# Patient Record
Sex: MALE | Race: WHITE
[De-identification: names, ages, dates, MRNs, and addresses within clinical notes are randomized per-mention and may not be internally consistent; named-entity substitution may affect disease eponyms.]

---

## 2020-09-21 ENCOUNTER — HOSPITAL ENCOUNTER (EMERGENCY)
Dept: HOSPITAL 75 - ER FS | Age: 20
Discharge: HOME | End: 2020-09-21
Payer: SELF-PAY

## 2020-09-21 VITALS — WEIGHT: 134.48 LBS | HEIGHT: 66.97 IN | BODY MASS INDEX: 21.11 KG/M2
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
## 2020-09-21 DIAGNOSIS — S60.121A: Primary | ICD-10-CM
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
## 2020-09-21 PROCEDURE — 73130 X-RAY EXAM OF HAND: CPT

## 2020-09-21 NOTE — ED UPPER EXTREMITY
General


Chief Complaint:  Upper Extremity


Stated Complaint:  SMASHED RT HAND





History of Present Illness


Date Seen by Provider:  Sep 21, 2020


Time Seen by Provider:  15:42


Initial Comments


Patient's right middle finger was smashed in a roller at work and he is hurting 

primarily on the distal tip.  No obvious open wounds or abrasions however there 

is some swelling at the tip.





Allergies and Home Medications


Allergies


Coded Allergies:  


     No Known Drug Allergies (Unverified , 9/21/20)





Patient Home Medication List


Home Medication List Reviewed:  Yes





Review of Systems


Constitutional:  no symptoms reported


Musculoskeletal:  joint pain, joint swelling


Skin:  no symptoms reported


Psychiatric/Neurological:  No Symptoms Reported





All Other Systems Reviewed


Negative Unless Noted:  Yes





Past Medical-Social-Family Hx


Patient Social History


Recent Foreign Travel:  No


Contact w/Someone Who Travel:  No





Physical Exam


Vital Signs


Capillary Refill :


Height, Weight, BMI


Height: '"


Weight: lbs. oz. kg;  BMI


Method:


General Appearance:  WD/WN, no apparent distress


Hand:  swelling (right middle finger with swelling at the distal and middle 

phalanx.  Extension and flexion intact.  Normal sensation and there is a very 

small subungual hematoma noted less than 10% of the nail. )


Neurologic/Tendon:  normal sensation, normal motor functions, normal tendon 

functions


Neurologic/Psychiatric:  no motor/sensory deficits


Skin:  warm/dry





Progress/Results/Core Measures


Results/Orders


My Orders





Orders - KERA FONTANEZ DO


Hand 3 View Right (9/21/20 15:28)


Hydrocodone/Apap 5/325 Tablet (Lortab 5 (9/21/20 15:45)


Ibuprofen  Tablet (Motrin  Tablet) (9/21/20 15:45)





Medications Given in ED





Current Medications








 Medications  Dose


 Ordered  Sig/Taylor


 Route  Start Time


 Stop Time Status Last Admin


Dose Admin


 


 Acetaminophen/


 Hydrocodone Bitart  2 tab  ONCE  ONCE


 PO  9/21/20 15:45


 9/21/20 15:46 DC 9/21/20 15:49


2 TAB


 


 Ibuprofen  600 mg  ONCE  ONCE


 PO  9/21/20 15:45


 9/21/20 15:46 DC 9/21/20 15:49


600 MG











Progress


Progress Note :  


Progress Note


I reviewed x-ray and there is no signs of fracture radiologist felt there is 

swelling in the second third digits that is present on physical exam as well.  I

reexamined his tendon function any [place] has intact flexion and extension with

no weakness noted.  Patient will be placed in a finger splint and told to follow

with orthopedics and come back to the ED sooner with any worsening pain weakness

or color change with or general concerns.  Patient aware and agreeable with plan

and verbalized understanding of the above instructions.





Departure


Impression





   Primary Impression:  


   Finger contusion


   Qualified Codes:  S60.031A - Contusion of right middle finger without damage 

   to nail, initial encounter


Disposition:  01 HOME, SELF-CARE


Condition:  Stable





Departure-Patient Inst.


Referrals:  


NO,LOCAL PHYSICIAN (PCP/Family)


Primary Care Physician


Patient Instructions:  Common Finger Injuries (DC)





Add. Discharge Instructions:  


Rest


Ice


Compression


Elevation





600mg ibuprofen every 6 hours





All discharge instructions reviewed with patient and/or family. Voiced 

understanding.


Scripts


Hydrocodone/Acetaminophen (Hydrocodone-Acetamin 5-325 mg) 1 Each Tablet


1 EACH PO qhs PRN for PAIN-SEVERE (8-10), #5 TAB


   Prov: KERA FONTANEZ DO         9/21/20


Work/School Note:  Work Release Form   Date Seen in the Emergency Department:  

Sep 21, 2020


   Return to Work:  Sep 28, 2020


   Restrictions:  Need Release from Doctor











KERA FONTANEZ DO             Sep 21, 2020 15:43

## 2024-03-19 NOTE — DIAGNOSTIC IMAGING REPORT
INDICATION: Right hand injury.



TECHNIQUE: AP, oblique, and lateral views of the right hand are

obtained.



FINDINGS: No definite fracture is identified. There is no

abnormal lytic or sclerotic focus. There is mild hyperextension

of the second and third phalanges at the proximal interphalangeal

joints.



IMPRESSION: Possible hyperexpansion of the second and third

fingers at the proximal interphalangeal joints could be related

to flexor tendon injuries and clinical correlation would be

useful. Otherwise, no definite acute abnormality or radiopaque

foreign body is identified.



Dictated by: 



  Dictated on workstation # [id number]